# Patient Record
Sex: MALE | Race: ASIAN | NOT HISPANIC OR LATINO | Employment: FULL TIME | URBAN - METROPOLITAN AREA
[De-identification: names, ages, dates, MRNs, and addresses within clinical notes are randomized per-mention and may not be internally consistent; named-entity substitution may affect disease eponyms.]

---

## 2017-10-29 ENCOUNTER — OFFICE VISIT (OUTPATIENT)
Dept: URGENT CARE | Facility: MEDICAL CENTER | Age: 37
End: 2017-10-29
Payer: COMMERCIAL

## 2017-10-29 PROCEDURE — 99203 OFFICE O/P NEW LOW 30 MIN: CPT

## 2017-10-30 NOTE — PROGRESS NOTES
Assessment  1  Acute bacterial conjunctivitis of both eyes (372 03) (H10 33)   2  Acute URI (465 9) (J06 9)    Plan  Acute URI    · Benzonatate 100 MG Oral Capsule; TAKE 1 CAPSULE 3 TIMES DAILY AS NEEDED   · Fluticasone Propionate 50 MCG/ACT Nasal Suspension; USE 2 SPRAYS IN EACH  NOSTRIL ONCE DAILY   · Tobramycin 0 3 % Ophthalmic Solution; INSTILL 1 DROP INTO AFFECTED EYE(S)  4 TIMES DAILY    Discussion/Summary  Discussion Summary:   I prescribed tobramycin eye drops to be applied in both eyes for 7 days, fluticasone nasal spray for nasal congestion and postnasal drip and Tessalon Perles every 8 hours for cough  Patient advised to thoroughly wash his hand prevent spreading eye infection  Medication Side Effects Reviewed: Possible side effects of new medications were reviewed with the patient/guardian today  Understands and agrees with treatment plan: The treatment plan was reviewed with the patient/guardian  The patient/guardian understands and agrees with the treatment plan   Counseling Documentation With Imm: The patient was counseled regarding  Chief Complaint  1  Cough   2  Red Eye  Chief Complaint Free Text Note Form: Pt with complaints of cough for 4 days  Since yesterday bilateral redness of eyes with yellow discharge    Denies fever or chills  History of Present Illness  HPI: Patient with 4 day history of productive cough  Describes cough as being productive clear sputum  He has been taking over-the-counter Robitussin which seems to help  Denies any shortness of breath  Describes 1 day history of fever of 99 F 4 days ago which has since resolved  Describes having some nasal congestion and postnasal drip  However, in the last day he has developed right eye redness and yellowish discharge  Describes having scratchy right eye but denies any light sensitivity  Hospital Based Practices Required Assessment:   Pain Assessment   the patient states they do not have pain   (on a scale of 0 to 10, the patient rates the pain at 0 )    Red Eye:   Associated symptoms include eye discharge-- and-- eye itching, but-- no eye pain-- and-- no lacrimation  Review of Systems  Focused-Male:   Constitutional: no fever or chills, feels well, no tiredness, no recent weight loss or weight gain  ENT: nasal discharge  Cardiovascular: no complaints of slow or fast heart rate, no chest pain, no palpitations, no leg claudication or lower extremity edema  Respiratory: cough  Past Medical History  1  No pertinent past medical history  Active Problems And Past Medical History Reviewed: The active problems and past medical history were reviewed and updated today  Social History   · Denies alcohol consumption (V49 89) (Z78 9)   · Never a smoker    Current Meds   1  No Reported Medications Recorded  Medication List Reviewed: The medication list was reviewed and updated today  Allergies  1  No Known Drug Allergies    Vitals  Signs   Recorded: [id number] 11:58AM   Temperature: 97 6 F  Heart Rate: 92  Respiration: 20  Systolic: 965  Diastolic: 70  Height: 5 ft 9 5 in  Weight: 188 lb   BMI Calculated: 27 36  BSA Calculated: 2 02  O2 Saturation: 100  Pain Scale: 0    Physical Exam    Constitutional   General appearance: No acute distress, well appearing and well nourished  Ears, Nose, Mouth, and Throat   External inspection of ears and nose: Normal     Otoscopic examination: Tympanic membrance translucent with normal light reflex  Canals patent without erythema  Nasal mucosa, septum, and turbinates: Abnormal  -- Hypertrophic turbinates  Oropharynx: Abnormal  -- Cobblestoning observed  Pulmonary   Respiratory effort: No increased work of breathing or signs of respiratory distress  Auscultation of lungs: Clear to auscultation  Cardiovascular   Palpation of heart: Normal PMI, no thrills  Auscultation of heart: Normal rate and rhythm, normal S1 and S2, without murmurs      Lymphatic   Palpation of lymph nodes in neck: No lymphadenopathy  Message  Return to work or school:   Keila Mon is under my professional care   He was seen in my office on 10/29/2017   He is able to return to work on  10/31/2017       Cloteal Brought   Electronically signed by : ERUM Toledo ; Oct 29 2017 12:32PM EST                       (Author)

## 2018-01-18 NOTE — MISCELLANEOUS
Message  Return to work or school:   Sita Rao is under my professional care   He was seen in my office on 10/29/2017   He is able to return to work on  10/31/2017       Josse Khan   Electronically signed by : ERUM Cunningham ; Oct 29 2017 12:32PM EST                       (Author)